# Patient Record
Sex: FEMALE | Race: AMERICAN INDIAN OR ALASKA NATIVE
[De-identification: names, ages, dates, MRNs, and addresses within clinical notes are randomized per-mention and may not be internally consistent; named-entity substitution may affect disease eponyms.]

---

## 2021-10-05 ENCOUNTER — HOSPITAL ENCOUNTER (OUTPATIENT)
Dept: HOSPITAL 5 - ED | Age: 61
Setting detail: OBSERVATION
LOS: 3 days | Discharge: HOME | End: 2021-10-08
Attending: INTERNAL MEDICINE | Admitting: HOSPITALIST
Payer: COMMERCIAL

## 2021-10-05 DIAGNOSIS — S93.04XA: ICD-10-CM
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
## 2021-10-05 DIAGNOSIS — Z79.899: ICD-10-CM

## 2021-10-05 DIAGNOSIS — S82.851A: Primary | ICD-10-CM
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
## 2021-10-05 DIAGNOSIS — Z98.890: ICD-10-CM

## 2021-10-05 DIAGNOSIS — I10: ICD-10-CM

## 2021-10-05 PROCEDURE — 82962 GLUCOSE BLOOD TEST: CPT

## 2021-10-05 PROCEDURE — C1713 ANCHOR/SCREW BN/BN,TIS/BN: HCPCS

## 2021-10-05 PROCEDURE — 94640 AIRWAY INHALATION TREATMENT: CPT

## 2021-10-05 PROCEDURE — 97116 GAIT TRAINING THERAPY: CPT

## 2021-10-05 PROCEDURE — 96365 THER/PROPH/DIAG IV INF INIT: CPT

## 2021-10-05 PROCEDURE — 36415 COLL VENOUS BLD VENIPUNCTURE: CPT

## 2021-10-05 PROCEDURE — 85025 COMPLETE CBC W/AUTO DIFF WBC: CPT

## 2021-10-05 PROCEDURE — 27822 TREATMENT OF ANKLE FRACTURE: CPT

## 2021-10-05 PROCEDURE — 93005 ELECTROCARDIOGRAM TRACING: CPT

## 2021-10-05 PROCEDURE — G0378 HOSPITAL OBSERVATION PER HR: HCPCS

## 2021-10-05 PROCEDURE — 73610 X-RAY EXAM OF ANKLE: CPT

## 2021-10-05 PROCEDURE — 71045 X-RAY EXAM CHEST 1 VIEW: CPT

## 2021-10-05 PROCEDURE — 97161 PT EVAL LOW COMPLEX 20 MIN: CPT

## 2021-10-05 PROCEDURE — 96366 THER/PROPH/DIAG IV INF ADDON: CPT

## 2021-10-05 PROCEDURE — 96376 TX/PRO/DX INJ SAME DRUG ADON: CPT

## 2021-10-05 PROCEDURE — 76942 ECHO GUIDE FOR BIOPSY: CPT

## 2021-10-05 PROCEDURE — 64447 NJX AA&/STRD FEMORAL NRV IMG: CPT

## 2021-10-05 PROCEDURE — 99285 EMERGENCY DEPT VISIT HI MDM: CPT

## 2021-10-05 PROCEDURE — 85610 PROTHROMBIN TIME: CPT

## 2021-10-05 PROCEDURE — 73600 X-RAY EXAM OF ANKLE: CPT

## 2021-10-05 PROCEDURE — 80048 BASIC METABOLIC PNL TOTAL CA: CPT

## 2021-10-05 PROCEDURE — 96372 THER/PROPH/DIAG INJ SC/IM: CPT

## 2021-10-05 PROCEDURE — C1769 GUIDE WIRE: HCPCS

## 2021-10-05 PROCEDURE — 96375 TX/PRO/DX INJ NEW DRUG ADDON: CPT

## 2021-10-05 PROCEDURE — 96361 HYDRATE IV INFUSION ADD-ON: CPT

## 2021-10-06 LAB
BASOPHILS # (AUTO): 0 K/MM3 (ref 0–0.1)
BASOPHILS NFR BLD AUTO: 0.3 % (ref 0–1.8)
BUN SERPL-MCNC: 14 MG/DL (ref 7–17)
BUN/CREAT SERPL: 14 %
CALCIUM SERPL-MCNC: 9.6 MG/DL (ref 8.4–10.2)
EOSINOPHIL # BLD AUTO: 0 K/MM3 (ref 0–0.4)
EOSINOPHIL NFR BLD AUTO: 0.8 % (ref 0–4.3)
HCT VFR BLD CALC: 37.1 % (ref 30.3–42.9)
HEMOLYSIS INDEX: 2
HGB BLD-MCNC: 12.6 GM/DL (ref 10.1–14.3)
LYMPHOCYTES # BLD AUTO: 1.4 K/MM3 (ref 1.2–5.4)
LYMPHOCYTES NFR BLD AUTO: 24.6 % (ref 13.4–35)
MCHC RBC AUTO-ENTMCNC: 34 % (ref 30–34)
MCV RBC AUTO: 96 FL (ref 79–97)
MONOCYTES # (AUTO): 0.5 K/MM3 (ref 0–0.8)
MONOCYTES % (AUTO): 8.7 % (ref 0–7.3)
PLATELET # BLD: 334 K/MM3 (ref 140–440)
RBC # BLD AUTO: 3.86 M/MM3 (ref 3.65–5.03)

## 2021-10-06 RX ADMIN — HYDROMORPHONE HYDROCHLORIDE PRN MG: 1 INJECTION, SOLUTION INTRAMUSCULAR; INTRAVENOUS; SUBCUTANEOUS at 15:20

## 2021-10-06 RX ADMIN — DEXTROSE AND SODIUM CHLORIDE SCH MLS/HR: 5; .45 INJECTION, SOLUTION INTRAVENOUS at 20:32

## 2021-10-06 RX ADMIN — MIDAZOLAM ONE MG: 1 INJECTION INTRAMUSCULAR; INTRAVENOUS at 00:47

## 2021-10-06 RX ADMIN — Medication SCH ML: at 11:13

## 2021-10-06 RX ADMIN — HEPARIN SODIUM SCH UNIT: 5000 INJECTION, SOLUTION INTRAVENOUS; SUBCUTANEOUS at 17:55

## 2021-10-06 RX ADMIN — HEPARIN SODIUM SCH UNIT: 5000 INJECTION, SOLUTION INTRAVENOUS; SUBCUTANEOUS at 22:16

## 2021-10-06 RX ADMIN — FAMOTIDINE SCH MG: 10 INJECTION, SOLUTION INTRAVENOUS at 22:16

## 2021-10-06 RX ADMIN — DEXTROSE AND SODIUM CHLORIDE SCH MLS/HR: 5; .45 INJECTION, SOLUTION INTRAVENOUS at 11:06

## 2021-10-06 RX ADMIN — Medication SCH ML: at 23:17

## 2021-10-06 RX ADMIN — MIDAZOLAM ONE MG: 1 INJECTION INTRAMUSCULAR; INTRAVENOUS at 01:51

## 2021-10-06 RX ADMIN — ONDANSETRON ONE MG: 2 INJECTION INTRAMUSCULAR; INTRAVENOUS at 01:47

## 2021-10-06 RX ADMIN — HEPARIN SODIUM SCH UNIT: 5000 INJECTION, SOLUTION INTRAVENOUS; SUBCUTANEOUS at 05:54

## 2021-10-06 RX ADMIN — HYDROMORPHONE HYDROCHLORIDE PRN MG: 1 INJECTION, SOLUTION INTRAMUSCULAR; INTRAVENOUS; SUBCUTANEOUS at 15:10

## 2021-10-06 RX ADMIN — FAMOTIDINE SCH MG: 10 INJECTION, SOLUTION INTRAVENOUS at 11:06

## 2021-10-06 RX ADMIN — CEFAZOLIN SCH MLS/HR: 330 INJECTION, POWDER, FOR SOLUTION INTRAMUSCULAR; INTRAVENOUS at 23:53

## 2021-10-06 RX ADMIN — ONDANSETRON ONE MG: 2 INJECTION INTRAMUSCULAR; INTRAVENOUS at 00:46
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
## 2021-10-06 NOTE — XRAY REPORT
Right ankle 3 views



INDICATION: Right ankle pain following injury



IMPRESSION: Improved anatomic alignment of the severe fracture dislocation of the right ankle followi
ng reduction. Overlying cast placement identified.



Signer Name: Maco Batres MD 

Signed: 10/6/2021 2:23 AM

Workstation Name: [id number]

## 2021-10-06 NOTE — ANESTHESIA CONSULTATION
Anesthesia Consult and Med Hx


Date of service: 10/06/21





- Airway


Anesthetic Teeth Evaluation: Good


ROM Head & Neck: Adequate


Mental/Hyoid Distance: Adequate


Mallampati Class: Class II


Intubation Access Assessment: Probably Good





- Pre-Operative Health Status


ASA Pre-Surgery Classification: ASA2


Proposed Anesthetic Plan: General


Nerve Block: Pop (post op, prn)





- Pulmonary


Hx Smoking: Yes (1 pack per week)


Hx Respiratory Symptoms: No





- Cardiovascular System


Hx Hypertension: No





- Central Nervous System


CVA: No





- Endocrine


Hx Renal Disease: No


Hx Liver Disease: No


Hx Insulin Dependent Diabetes: No


Hx Non-Insulin Dependent Diabetes: No


Hx Thyroid Disease: No





- Other Systems


Hx Obesity: No

## 2021-10-06 NOTE — XRAY REPORT
Right ankle 3 views



INDICATION: Fracture fixation



FINDINGS: Postoperative change with fibular plate and medial malleolar screw. Postsurgical fluoroscop
ic alignment appears normal.



Signer Name: Geoffrey Navarro MD 

Signed: 10/6/2021 3:07 PM

Workstation Name: [id number]

## 2021-10-06 NOTE — XRAY REPORT
CHEST 1 VIEW 



INDICATION / CLINICAL INFORMATION:

Preoperative surgery clearance.





FINDINGS:



SUPPORT DEVICES: None.



HEART / MEDIASTINUM: No significant abnormality. 



LUNGS / PLEURA: No significant pulmonary or pleural abnormality. No pneumothorax. 



ADDITIONAL FINDINGS: No significant additional findings.



IMPRESSION:

1. No acute findings.



Signer Name: Maco Batres MD 

Signed: 10/6/2021 2:23 AM

Workstation Name: [id number]

## 2021-10-06 NOTE — POST ANESTHESIA EVALUATION
- Post Anesthesia Evaluation


Patient Participated: Yes


Airway Patent: Yes


Stable Respiratory Function: Yes


Nausea/Vomiting: No


Temp > 96.8F: Yes


Pain Manageable: Yes


Adequeate Hydration: Yes


Anesthesia Complications: No


Other Comments: Popliteal block placed in PACU for post op analgesia w/ good 

result.

## 2021-10-06 NOTE — EVENT NOTE
Date: 10/06/21





Patient seen and examined


Patient presented with right ankle fracture, orthopedic consulted


Patient is s/p ORIF today, complains of pain and swelling on the right foot


Continue PT evaluation supportive care


Discharge planning when cleared by orthopedics

## 2021-10-06 NOTE — XRAY REPORT
Right ankle 3 views



INDICATION: Right ankle pain following injury



IMPRESSION: Severely fracture or dislocation of the right ankle with comminuted intra-articular fract
ure involving the tibial plafond with extension to the medial malleolus. There is also a displaced fr
acture involving the lateral malleolus. The talus is posteriorly displaced with respect to the tibial
 plafond.



Signer Name: Maco Batres MD 

Signed: 10/6/2021 12:35 AM

Workstation Name: [id number]
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
## 2021-10-06 NOTE — OPERATIVE REPORT
Operative Report


Operative Report: 


Operative report





Preop diagnosis : Trimalleolar fracture dislocation, RIGHT ankle


Postop diagnosis: Trimalleolar fracture dislocation (without syndesmosis 

disruption) , RIGHT ankle





Procedure: Open reduction internal fixation lateral malleolus  and medial 

malleolus fracture, RIGHT ankle





Surgeon: Chema Bolton MD





Anesthesia: General with LMA





Details of operative technique: The Patient was prepared in the holding area 

after being brought over from the emergency department, where she was cleared 

for surgery.  She was then brought to the operating room where she underwent 

satisfactory general anesthesia utilizing an LMA.  She was placed in the supine 

position and all pressure points were well-padded.  The right ankle ,foot and 

leg were then prepped and draped in the usual sterile fashion utilizing 

ChloraPrep solution as per protocol.  She was administered 2 g Ancef IV prior to

the start of the case. The  Extremity was then elevated, exsanguinated with an 

Esmarch bandage and the tourniquet was inflated to 300 mmHg.  A timeout was then

called by the circulating nurse and once again the correct site was identified.


C arm imaging was utilized throughout the case.  Initial views showed a 

trimalleolar fracture that had been reduced in the emergency room for the 

tibiotalar dislocation.  There was no disruption of the syndesmosis and the 

lateral malleolus fracture was an oblique minimally displaced fracture just 

above the joint line; the medial malleolus fracture was also a small singular 

fragment that was displaced posteriorly.  The posterior malleolus was a small 

fragment less than 20% of the surface area of the plafond, and also was 

minimally displaced.


Lateral Malleolus Fixation.  Lateral malleolus was approached first.  A lateral 

longitudinal incision approximately 10 cm in length was made overlying the 

lateral malleolar fracture.  The dissection was carried down through the fibro

fatty layer.  Fracture hematoma was evacuated and the fracture was visualized 

which showed a minimally displaced distal fracture  fragment with an oblique 

fracture line proximal to the joint line for the ankle joint.  With minimal 

effort the fracture was reduced and a reduction clamp was used to stabilize the 

reduction.  C- arm images confirmed near anatomic alignment.  A 6-hole plate was

then contoured and placed on the posterolateral edge of the distal fibula and 

utilizing 3.5 mm cortical screws (all but one locking )this fracture was secured

in anatomic position with rigid stabilization.  C-arm images in both AP and 

lateral planes confirmed the reduction and stabilization.  Attention was then 

turned to the medial malleolus fracture.


Medial Malleolar Fixation  : Utilizing a small curved incision less than 1 cm in

length, an opening was made at the very distal aspect of the medial malleolus 

distal fragment.  Reduction earlier had relatively stabilized the medial 

fragment from its posterior position.  Utilizing a 3.5 mm partially-threaded 

cannulated screw a single cannulated screw was placed across the fracture site, 

44 mm in length.  No other stabilization was required.  C arm images then were 

taken for both AP ,lateral and mortise views while applying lateral stress there

was no mortise widening and both fractures that were fixed showed excellent 

stability.  The posterior malleolus fragment was deemed to be inconsequential 

and was therefore not surgically stabilized.


Wounds were all irrigated thoroughly with normal saline.  Deep fascial layer was

closed with 0 Vicryl suture as was the subcutaneous layer for the lateral 

incision.  The skin was reapproximated with 3-0 nylon interrupted sutures.  The 

medial malleolus stab incision was repaired with 2 single 3-0 nylon sutures.  

Wounds were dressed with Xeroform and ABD pad secured by a Kerlix wrap, followed

by application of a posterior splint which was secured with an Ace wrap.  The 

patient was then awakened and taken to recovery room in excellent condition.





Estimated blood loss: Minimal





Drains: None





Complications: None





Tourniquet time: 51 minutes
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
## 2021-10-06 NOTE — ELECTROCARDIOGRAPH REPORT
[facility]

                                       

Test Date:    2021-10-06               Test Time:    03:18:21

Pat Name:     GARETH HUBER             Department:   

Patient ID:   [id number]          Room:         [facility]

Gender:       F                        Technician:   LISA

:          1960               Requested By: KERRI RASCON

Order Number: [id number]              Reading MD:   Dino King

                                 Measurements

Intervals                              Axis          

Rate:         49                       P:            50

AZ:           165                      QRS:          72

QRSD:         67                       T:            66

QT:           468                                    

QTc:          424                                    

                           Interpretive Statements

Sinus bradycardia

ST elevation, consider lateral injury

No previous ECG available for comparison

Electronically Signed On 10-6-2021 11:48:44 EDT by Dino King

## 2021-10-07 LAB
BASOPHILS # (AUTO): 0 K/MM3 (ref 0–0.1)
BASOPHILS NFR BLD AUTO: 0.2 % (ref 0–1.8)
BUN SERPL-MCNC: 10 MG/DL (ref 7–17)
BUN/CREAT SERPL: 11 %
CALCIUM SERPL-MCNC: 8.9 MG/DL (ref 8.4–10.2)
EOSINOPHIL # BLD AUTO: 0 K/MM3 (ref 0–0.4)
EOSINOPHIL NFR BLD AUTO: 0 % (ref 0–4.3)
HCT VFR BLD CALC: 38.3 % (ref 30.3–42.9)
HEMOLYSIS INDEX: 1
HGB BLD-MCNC: 13.3 GM/DL (ref 10.1–14.3)
INR PPP: 0.94 (ref 0.87–1.13)
LYMPHOCYTES # BLD AUTO: 0.5 K/MM3 (ref 1.2–5.4)
LYMPHOCYTES NFR BLD AUTO: 8 % (ref 13.4–35)
MCHC RBC AUTO-ENTMCNC: 35 % (ref 30–34)
MCV RBC AUTO: 97 FL (ref 79–97)
MONOCYTES # (AUTO): 0.3 K/MM3 (ref 0–0.8)
MONOCYTES % (AUTO): 4.6 % (ref 0–7.3)
PLATELET # BLD: 340 K/MM3 (ref 140–440)
RBC # BLD AUTO: 3.95 M/MM3 (ref 3.65–5.03)

## 2021-10-07 RX ADMIN — Medication SCH ML: at 21:45

## 2021-10-07 RX ADMIN — CEFAZOLIN SCH MLS/HR: 330 INJECTION, POWDER, FOR SOLUTION INTRAMUSCULAR; INTRAVENOUS at 13:37

## 2021-10-07 RX ADMIN — FAMOTIDINE SCH MG: 10 INJECTION, SOLUTION INTRAVENOUS at 09:29

## 2021-10-07 RX ADMIN — Medication SCH ML: at 09:30

## 2021-10-07 RX ADMIN — DEXTROSE AND SODIUM CHLORIDE SCH MLS/HR: 5; .45 INJECTION, SOLUTION INTRAVENOUS at 05:29

## 2021-10-07 RX ADMIN — DEXTROSE AND SODIUM CHLORIDE SCH MLS/HR: 5; .45 INJECTION, SOLUTION INTRAVENOUS at 17:27

## 2021-10-07 RX ADMIN — CEFAZOLIN SCH MLS/HR: 330 INJECTION, POWDER, FOR SOLUTION INTRAMUSCULAR; INTRAVENOUS at 05:29

## 2021-10-07 RX ADMIN — HEPARIN SODIUM SCH UNIT: 5000 INJECTION, SOLUTION INTRAVENOUS; SUBCUTANEOUS at 05:30

## 2021-10-07 RX ADMIN — HEPARIN SODIUM SCH UNIT: 5000 INJECTION, SOLUTION INTRAVENOUS; SUBCUTANEOUS at 13:37

## 2021-10-07 RX ADMIN — HEPARIN SODIUM SCH UNIT: 5000 INJECTION, SOLUTION INTRAVENOUS; SUBCUTANEOUS at 21:42

## 2021-10-07 RX ADMIN — FAMOTIDINE SCH MG: 10 INJECTION, SOLUTION INTRAVENOUS at 21:42

## 2021-10-07 RX ADMIN — MORPHINE SULFATE PRN MG: 4 INJECTION, SOLUTION INTRAMUSCULAR; INTRAVENOUS at 19:24

## 2021-10-07 NOTE — PROGRESS NOTE
Assessment and Plan





-- Closed right ankle fracture


--Dislocation of ankle, right, closed


Orthopedic consulted, s/p ORIF of the right ankle joint


Continue PT eval, follow clinically, pain meds as needed


Encourage ambulation


Patient complains of swelling pain and numbness of the right foot


Continue supportive care, if clinically improves possible discharge tomorrow





-- DVT prophylaxis


Heparin 5000 units subcu every 8 hours for DVT prophylaxis.  Pepcid 20 mg IV 

every 12 hours for GI prophylaxis. 





-- Patient is a full code











Subjective


Date of service: 10/07/21


Interval history: 





Patient seen and examined.  Medical records and medication list reviewed.  


No acute event overnight noted by the RN.  


Patient denies any chest pain or difficulty breathing.  Patient is tolerating 

diet. 


Continue to complains of swelling and numbness of the right foot


Discussed plan of care at bedside with patient.








Objective





- Exam


Narrative Exam: 





GENERAL:  well-developed and well-nourished elderly -American female 

lying on bed appeared to be in no discomfort. 


HEENT: Normocephalic.  Atraumatic.  No conjunctival congestion or icterus. 

Patient has moist mucous membranes.


NECK: Supple.  Trachea midline.


CHEST/LUNGS: Clear to auscultated bilaterally, breathing nonlabored. No wheezes 

crackles or rhonchi.


HEART/CARDIOVASCULAR: Regular in rate and rhythm.  S1 and S2 positive.


ABDOMEN: Abdomen is soft, nontender.  Patient has normal bowel sounds.  


SKIN: There is no rash.  Warm and dry.


NEURO:  No focal motor deficit.  Follows command.


MUSCULOSKELETAL: Right ankle with surgical dressing with swelling and tenderness

of the toes


EXTRIMITY: + edema, no cyanosis or clubbing.


PSYCH:  Cooperative.





- Constitutional


Vitals: 


                               Vital Signs - 12hr











  10/07/21 10/07/21 10/07/21





  13:35 18:17 19:24


 


Temperature 98.2 F 98.5 F 


 


Pulse Rate 63 65 


 


Respiratory 16 16 18





Rate   


 


Blood Pressure 131/52 167/63 


 


O2 Sat by Pulse 100 100 





Oximetry   














- Labs


CBC & Chem 7: 


                                 10/07/21 04:00





                                 10/07/21 04:00


Labs: 


                              Abnormal lab results











  10/07/21 10/07/21 Range/Units





  04:00 04:00 


 


MCH  34 H   (28-32)  pg


 


MCHC  35 H   (30-34)  %


 


RDW  13.0 L   (13.2-15.2)  %


 


Lymph % (Auto)  8.0 L   (13.4-35.0)  %


 


Lymph # (Auto)  0.5 L   (1.2-5.4)  K/mm3


 


Seg Neutrophils %  87.2 H   (40.0-70.0)  %


 


Carbon Dioxide   21 L  (22-30)  mmol/L


 


Glucose   153 H  ()  mg/dL

## 2021-10-08 VITALS — SYSTOLIC BLOOD PRESSURE: 136 MMHG | DIASTOLIC BLOOD PRESSURE: 67 MMHG

## 2021-10-08 RX ADMIN — MORPHINE SULFATE PRN MG: 4 INJECTION, SOLUTION INTRAMUSCULAR; INTRAVENOUS at 05:38

## 2021-10-08 RX ADMIN — FAMOTIDINE SCH MG: 10 INJECTION, SOLUTION INTRAVENOUS at 09:00

## 2021-10-08 RX ADMIN — DEXTROSE AND SODIUM CHLORIDE SCH MLS/HR: 5; .45 INJECTION, SOLUTION INTRAVENOUS at 05:37

## 2021-10-08 RX ADMIN — HEPARIN SODIUM SCH UNIT: 5000 INJECTION, SOLUTION INTRAVENOUS; SUBCUTANEOUS at 05:38

## 2021-10-08 RX ADMIN — Medication SCH ML: at 09:00

## 2021-10-08 NOTE — PROGRESS NOTE
Subjective


Date of service: 10/07/21


Principal diagnosis: Fracture/Dislocation RIGHT ankle


Interval history: 





POD  #1 


Doing well on the first post-op day  S/P ORIF  RIGHT ankl;e fracture; no MAJOR 

COMPLAINTS











Objective


Vital signs: 


                               Vital Signs - 12hr











  10/07/21 10/07/21





  22:00 23:48


 


Temperature  98.7 F


 


Pulse Rate  53 L


 


Respiratory  20





Rate  


 


Blood Pressure  156/69


 


O2 Sat by Pulse 93 100





Oximetry  








Lying in hospital bed awake and alert.  Lower extremity elevated with intact 

postop splint.  Toes are neurovascularly intact.  Vital signs are stable.





- Labs


CBC & BMP: 


                                 10/07/21 04:00





                                 10/07/21 04:00


Labs: 


                              Abnormal lab results











  10/08/21 Range/Units





  07:37 


 


POC Glucose  111 H  ()  mg/dL

## 2021-10-08 NOTE — PROGRESS NOTE
Subjective


Date of service: 10/08/21


Principal diagnosis: Fracture/Dislocation RIGHT ankle





Objective


Vital signs: 


                               Vital Signs - 12hr











  10/07/21 10/07/21





  22:00 23:48


 


Temperature  98.7 F


 


Pulse Rate  53 L


 


Respiratory  20





Rate  


 


Blood Pressure  156/69


 


O2 Sat by Pulse 93 100





Oximetry  














- Labs


CBC & BMP: 


                                 10/07/21 04:00





                                 10/07/21 04:00


Labs: 


                              Abnormal lab results











  10/08/21 Range/Units





  07:37 


 


POC Glucose  111 H  ()  mg/dL
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
## 2021-10-08 NOTE — PROGRESS NOTE
Subjective


Date of service: 10/08/21


Principal diagnosis: Fracture/Dislocation RIGHT ankle


Interval history: 





No issues during the night and physical therapy has started working with the 

patient





Objective


Vital signs: 


                               Vital Signs - 12hr











  10/07/21 10/07/21





  22:00 23:48


 


Temperature  98.7 F


 


Pulse Rate  53 L


 


Respiratory  20





Rate  


 


Blood Pressure  156/69


 


O2 Sat by Pulse 93 100





Oximetry  














- Labs


CBC & BMP: 


                                 10/07/21 04:00





                                 10/07/21 04:00


Labs: 


                              Abnormal lab results











  10/08/21 Range/Units





  07:37 


 


POC Glucose  111 H  ()  mg/dL

## 2021-10-21 ENCOUNTER — HOSPITAL ENCOUNTER (OUTPATIENT)
Dept: HOSPITAL 5 - XRAY | Age: 61
Discharge: HOME | End: 2021-10-21
Attending: ORTHOPAEDIC SURGERY
Payer: COMMERCIAL
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
## 2021-10-21 DIAGNOSIS — S82.891A: Primary | ICD-10-CM
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
## 2021-10-21 NOTE — XRAY REPORT
RIGHT ANKLE 3 VIEWS



INDICATION:  OTHER FRACTURE OF RIGHT LOWER LEG,. 



COMPARISON: 10/6/2021



IMPRESSION:  Previously described bimalleolar fracture has been internally fixated. Alignment is near
-anatomic. No joint pathology is appreciated. There is diffuse soft tissue swelling. The posterior sp
lint has been removed.



Signer Name: Edwardo Nava Jr, MD 

Signed: 10/21/2021 2:37 PM

Workstation Name: [id number]